# Patient Record
Sex: MALE | Race: WHITE | NOT HISPANIC OR LATINO | URBAN - METROPOLITAN AREA
[De-identification: names, ages, dates, MRNs, and addresses within clinical notes are randomized per-mention and may not be internally consistent; named-entity substitution may affect disease eponyms.]

---

## 2017-02-02 ENCOUNTER — COMMUNICATION - HEALTHEAST (OUTPATIENT)
Dept: INTERNAL MEDICINE | Facility: CLINIC | Age: 82
End: 2017-02-02

## 2017-02-02 DIAGNOSIS — E78.5 HYPERLIPIDEMIA: ICD-10-CM

## 2017-05-10 ENCOUNTER — COMMUNICATION - HEALTHEAST (OUTPATIENT)
Dept: INTERNAL MEDICINE | Facility: CLINIC | Age: 82
End: 2017-05-10

## 2017-05-10 DIAGNOSIS — E03.9 HYPOTHYROIDISM: ICD-10-CM

## 2017-05-17 ENCOUNTER — OFFICE VISIT - HEALTHEAST (OUTPATIENT)
Dept: INTERNAL MEDICINE | Facility: CLINIC | Age: 82
End: 2017-05-17

## 2017-05-17 DIAGNOSIS — E03.9 HYPOTHYROIDISM: ICD-10-CM

## 2017-05-17 DIAGNOSIS — E78.00 PURE HYPERCHOLESTEROLEMIA: ICD-10-CM

## 2017-05-17 DIAGNOSIS — N40.0 BPH (BENIGN PROSTATIC HYPERPLASIA): ICD-10-CM

## 2017-05-17 DIAGNOSIS — I10 ESSENTIAL HYPERTENSION: ICD-10-CM

## 2017-05-17 ASSESSMENT — MIFFLIN-ST. JEOR: SCORE: 1689.19

## 2017-08-08 ENCOUNTER — COMMUNICATION - HEALTHEAST (OUTPATIENT)
Dept: INTERNAL MEDICINE | Facility: CLINIC | Age: 82
End: 2017-08-08

## 2017-08-08 DIAGNOSIS — I10 UNSPECIFIED ESSENTIAL HYPERTENSION: ICD-10-CM

## 2017-08-15 ENCOUNTER — COMMUNICATION - HEALTHEAST (OUTPATIENT)
Dept: INTERNAL MEDICINE | Facility: CLINIC | Age: 82
End: 2017-08-15

## 2017-08-15 ENCOUNTER — OFFICE VISIT - HEALTHEAST (OUTPATIENT)
Dept: INTERNAL MEDICINE | Facility: CLINIC | Age: 82
End: 2017-08-15

## 2017-08-15 DIAGNOSIS — E03.9 HYPOTHYROIDISM: ICD-10-CM

## 2017-08-15 DIAGNOSIS — I10 ESSENTIAL HYPERTENSION: ICD-10-CM

## 2017-08-16 ENCOUNTER — AMBULATORY - HEALTHEAST (OUTPATIENT)
Dept: INTERNAL MEDICINE | Facility: CLINIC | Age: 82
End: 2017-08-16

## 2017-09-12 ENCOUNTER — COMMUNICATION - HEALTHEAST (OUTPATIENT)
Dept: INTERNAL MEDICINE | Facility: CLINIC | Age: 82
End: 2017-09-12

## 2017-09-12 DIAGNOSIS — N40.0 BPH (BENIGN PROSTATIC HYPERPLASIA): ICD-10-CM

## 2017-10-31 ENCOUNTER — COMMUNICATION - HEALTHEAST (OUTPATIENT)
Dept: INTERNAL MEDICINE | Facility: CLINIC | Age: 82
End: 2017-10-31

## 2017-10-31 DIAGNOSIS — E78.5 HYPERLIPIDEMIA: ICD-10-CM

## 2017-11-20 ENCOUNTER — OFFICE VISIT - HEALTHEAST (OUTPATIENT)
Dept: INTERNAL MEDICINE | Facility: CLINIC | Age: 82
End: 2017-11-20

## 2017-11-20 DIAGNOSIS — E03.9 HYPOTHYROIDISM: ICD-10-CM

## 2017-11-20 DIAGNOSIS — I10 ESSENTIAL HYPERTENSION: ICD-10-CM

## 2018-01-01 ENCOUNTER — RECORDS - HEALTHEAST (OUTPATIENT)
Dept: GENERAL RADIOLOGY | Facility: CLINIC | Age: 83
End: 2018-01-01

## 2018-01-01 ENCOUNTER — OFFICE VISIT - HEALTHEAST (OUTPATIENT)
Dept: INTERNAL MEDICINE | Facility: CLINIC | Age: 83
End: 2018-01-01

## 2018-01-01 DIAGNOSIS — M25.551 HIP PAIN, RIGHT: ICD-10-CM

## 2018-01-01 DIAGNOSIS — R39.11 BENIGN PROSTATIC HYPERPLASIA WITH URINARY HESITANCY: ICD-10-CM

## 2018-01-01 DIAGNOSIS — E03.9 ACQUIRED HYPOTHYROIDISM: ICD-10-CM

## 2018-01-01 DIAGNOSIS — I10 ESSENTIAL HYPERTENSION: ICD-10-CM

## 2018-01-01 DIAGNOSIS — H35.30 MACULAR DEGENERATION (SENILE) OF RETINA: ICD-10-CM

## 2018-01-01 DIAGNOSIS — G70.00 MYASTHENIA GRAVIS (H): ICD-10-CM

## 2018-01-01 DIAGNOSIS — M25.551 PAIN IN RIGHT HIP: ICD-10-CM

## 2018-01-01 DIAGNOSIS — N40.1 BENIGN PROSTATIC HYPERPLASIA WITH URINARY HESITANCY: ICD-10-CM

## 2018-01-01 RX ORDER — POLYETHYLENE GLYCOL 3350 17 G/17G
17 POWDER, FOR SOLUTION ORAL DAILY PRN
Status: SHIPPED | COMMUNITY
Start: 2018-01-01

## 2018-01-01 RX ORDER — PYRIDOSTIGMINE BROMIDE 60 MG/1
60 TABLET ORAL 4 TIMES DAILY
Refills: 3 | Status: SHIPPED | COMMUNITY
Start: 2018-07-26

## 2018-02-12 ENCOUNTER — COMMUNICATION - HEALTHEAST (OUTPATIENT)
Dept: INTERNAL MEDICINE | Facility: CLINIC | Age: 83
End: 2018-02-12

## 2018-02-12 DIAGNOSIS — E03.9 HYPOTHYROID: ICD-10-CM

## 2018-03-03 ENCOUNTER — COMMUNICATION - HEALTHEAST (OUTPATIENT)
Dept: INTERNAL MEDICINE | Facility: CLINIC | Age: 83
End: 2018-03-03

## 2018-03-03 DIAGNOSIS — N40.0 BPH (BENIGN PROSTATIC HYPERPLASIA): ICD-10-CM

## 2018-03-27 ENCOUNTER — OFFICE VISIT - HEALTHEAST (OUTPATIENT)
Dept: FAMILY MEDICINE | Facility: CLINIC | Age: 83
End: 2018-03-27

## 2018-03-27 DIAGNOSIS — J20.9 ACUTE BRONCHITIS WITH SYMPTOMS > 10 DAYS: ICD-10-CM

## 2018-04-16 ENCOUNTER — OFFICE VISIT - HEALTHEAST (OUTPATIENT)
Dept: INTERNAL MEDICINE | Facility: CLINIC | Age: 83
End: 2018-04-16

## 2018-04-16 DIAGNOSIS — I10 ESSENTIAL HYPERTENSION: ICD-10-CM

## 2018-04-16 DIAGNOSIS — E78.00 PURE HYPERCHOLESTEROLEMIA: ICD-10-CM

## 2018-04-16 DIAGNOSIS — E03.9 HYPOTHYROIDISM: ICD-10-CM

## 2018-04-16 LAB
ALBUMIN SERPL-MCNC: 3.4 G/DL (ref 3.5–5)
ALBUMIN UR-MCNC: NEGATIVE MG/DL
ALP SERPL-CCNC: 118 U/L (ref 45–120)
ALT SERPL W P-5'-P-CCNC: 20 U/L (ref 0–45)
ANION GAP SERPL CALCULATED.3IONS-SCNC: 9 MMOL/L (ref 5–18)
APPEARANCE UR: CLEAR
AST SERPL W P-5'-P-CCNC: 17 U/L (ref 0–40)
BILIRUB SERPL-MCNC: 0.7 MG/DL (ref 0–1)
BILIRUB UR QL STRIP: NEGATIVE
BUN SERPL-MCNC: 13 MG/DL (ref 8–28)
CALCIUM SERPL-MCNC: 8.9 MG/DL (ref 8.5–10.5)
CHLORIDE BLD-SCNC: 101 MMOL/L (ref 98–107)
CHOLEST SERPL-MCNC: 136 MG/DL
CO2 SERPL-SCNC: 25 MMOL/L (ref 22–31)
COLOR UR AUTO: YELLOW
CREAT SERPL-MCNC: 0.77 MG/DL (ref 0.7–1.3)
ERYTHROCYTE [DISTWIDTH] IN BLOOD BY AUTOMATED COUNT: 12.2 % (ref 11–14.5)
FASTING STATUS PATIENT QL REPORTED: YES
GFR SERPL CREATININE-BSD FRML MDRD: >60 ML/MIN/1.73M2
GLUCOSE BLD-MCNC: 92 MG/DL (ref 70–125)
GLUCOSE UR STRIP-MCNC: NEGATIVE MG/DL
HCT VFR BLD AUTO: 42.2 % (ref 40–54)
HDLC SERPL-MCNC: 46 MG/DL
HGB BLD-MCNC: 14.2 G/DL (ref 14–18)
HGB UR QL STRIP: NEGATIVE
KETONES UR STRIP-MCNC: NEGATIVE MG/DL
LDLC SERPL CALC-MCNC: 76 MG/DL
LEUKOCYTE ESTERASE UR QL STRIP: NEGATIVE
MCH RBC QN AUTO: 31.1 PG (ref 27–34)
MCHC RBC AUTO-ENTMCNC: 33.6 G/DL (ref 32–36)
MCV RBC AUTO: 93 FL (ref 80–100)
NITRATE UR QL: NEGATIVE
PH UR STRIP: 7 [PH] (ref 5–8)
PLATELET # BLD AUTO: 232 THOU/UL (ref 140–440)
PMV BLD AUTO: 7.7 FL (ref 7–10)
POTASSIUM BLD-SCNC: 4.8 MMOL/L (ref 3.5–5)
PROT SERPL-MCNC: 6.7 G/DL (ref 6–8)
RBC # BLD AUTO: 4.56 MILL/UL (ref 4.4–6.2)
SODIUM SERPL-SCNC: 135 MMOL/L (ref 136–145)
SP GR UR STRIP: 1.01 (ref 1–1.03)
TRIGL SERPL-MCNC: 68 MG/DL
TSH SERPL DL<=0.005 MIU/L-ACNC: 3.36 UIU/ML (ref 0.3–5)
UROBILINOGEN UR STRIP-ACNC: NORMAL
WBC: 8.3 THOU/UL (ref 4–11)

## 2018-04-20 ENCOUNTER — AMBULATORY - HEALTHEAST (OUTPATIENT)
Dept: LAB | Facility: CLINIC | Age: 83
End: 2018-04-20

## 2018-04-20 ENCOUNTER — RECORDS - HEALTHEAST (OUTPATIENT)
Dept: ADMINISTRATIVE | Facility: OTHER | Age: 83
End: 2018-04-20

## 2018-04-20 DIAGNOSIS — H53.2 VERTICAL DIPLOPIA: ICD-10-CM

## 2018-04-20 LAB
C REACTIVE PROTEIN LHE: 0.6 MG/DL (ref 0–0.8)
ERYTHROCYTE [SEDIMENTATION RATE] IN BLOOD BY WESTERGREN METHOD: 22 MM/HR (ref 0–15)

## 2018-04-27 LAB — ACHR BIND IGG+IGM SER IA-SCNC: 5.23 NMOL/L

## 2018-04-30 ENCOUNTER — COMMUNICATION - HEALTHEAST (OUTPATIENT)
Dept: INTERNAL MEDICINE | Facility: CLINIC | Age: 83
End: 2018-04-30

## 2018-04-30 DIAGNOSIS — E78.5 HYPERLIPIDEMIA: ICD-10-CM

## 2018-05-01 ENCOUNTER — COMMUNICATION - HEALTHEAST (OUTPATIENT)
Dept: INTERNAL MEDICINE | Facility: CLINIC | Age: 83
End: 2018-05-01

## 2018-05-01 DIAGNOSIS — G70.00 MYASTHENIA GRAVIS (H): ICD-10-CM

## 2018-05-01 DIAGNOSIS — H53.9 VISION ABNORMALITIES: ICD-10-CM

## 2018-05-05 ENCOUNTER — COMMUNICATION - HEALTHEAST (OUTPATIENT)
Dept: INTERNAL MEDICINE | Facility: CLINIC | Age: 83
End: 2018-05-05

## 2018-05-05 DIAGNOSIS — I10 ESSENTIAL HYPERTENSION: ICD-10-CM

## 2018-05-13 ENCOUNTER — COMMUNICATION - HEALTHEAST (OUTPATIENT)
Dept: INTERNAL MEDICINE | Facility: CLINIC | Age: 83
End: 2018-05-13

## 2018-05-13 DIAGNOSIS — E03.9 HYPOTHYROID: ICD-10-CM

## 2018-06-03 ENCOUNTER — COMMUNICATION - HEALTHEAST (OUTPATIENT)
Dept: INTERNAL MEDICINE | Facility: CLINIC | Age: 83
End: 2018-06-03

## 2018-06-03 DIAGNOSIS — N40.0 BPH (BENIGN PROSTATIC HYPERPLASIA): ICD-10-CM

## 2018-07-26 ENCOUNTER — RECORDS - HEALTHEAST (OUTPATIENT)
Dept: LAB | Facility: HOSPITAL | Age: 83
End: 2018-07-26

## 2018-07-26 ENCOUNTER — RECORDS - HEALTHEAST (OUTPATIENT)
Dept: ADMINISTRATIVE | Facility: OTHER | Age: 83
End: 2018-07-26

## 2018-07-31 ENCOUNTER — HOSPITAL ENCOUNTER (OUTPATIENT)
Dept: CT IMAGING | Facility: CLINIC | Age: 83
Discharge: HOME | End: 2018-07-31

## 2018-07-31 DIAGNOSIS — G70.00 OCULAR MYASTHENIA GRAVIS (H): ICD-10-CM

## 2018-07-31 LAB
CREAT BLD-MCNC: 0.9 MG/DL
POC GFR AMER AF HE - HISTORICAL: >60 ML/MIN/1.73M2
POC GFR NON AMER AF HE - HISTORICAL: >60 ML/MIN/1.73M2

## 2018-08-07 LAB
MISCELLANEOUS TEST DEPT. - HE HISTORICAL: NORMAL
PERFORMING LAB: NORMAL
SPECIMEN STATUS: NORMAL
TEST NAME: NORMAL

## 2018-09-07 ENCOUNTER — RECORDS - HEALTHEAST (OUTPATIENT)
Dept: ADMINISTRATIVE | Facility: OTHER | Age: 83
End: 2018-09-07

## 2019-01-01 ENCOUNTER — RECORDS - HEALTHEAST (OUTPATIENT)
Dept: LAB | Facility: HOSPITAL | Age: 84
End: 2019-01-01

## 2019-01-01 ENCOUNTER — COMMUNICATION - HEALTHEAST (OUTPATIENT)
Dept: INTERNAL MEDICINE | Facility: CLINIC | Age: 84
End: 2019-01-01

## 2019-01-01 ENCOUNTER — AMBULATORY - HEALTHEAST (OUTPATIENT)
Dept: OTHER | Facility: CLINIC | Age: 84
End: 2019-01-01

## 2019-01-01 ENCOUNTER — OFFICE VISIT - HEALTHEAST (OUTPATIENT)
Dept: INTERNAL MEDICINE | Facility: CLINIC | Age: 84
End: 2019-01-01

## 2019-01-01 ENCOUNTER — INFUSION - HEALTHEAST (OUTPATIENT)
Dept: INFUSION THERAPY | Facility: HOSPITAL | Age: 84
End: 2019-01-01

## 2019-01-01 ENCOUNTER — SURGERY - HEALTHEAST (OUTPATIENT)
Dept: GASTROENTEROLOGY | Facility: CLINIC | Age: 84
End: 2019-01-01

## 2019-01-01 ENCOUNTER — ANESTHESIA - HEALTHEAST (OUTPATIENT)
Dept: INTENSIVE CARE | Facility: CLINIC | Age: 84
End: 2019-01-01

## 2019-01-01 ENCOUNTER — RECORDS - HEALTHEAST (OUTPATIENT)
Dept: ADMINISTRATIVE | Facility: OTHER | Age: 84
End: 2019-01-01

## 2019-01-01 ENCOUNTER — RECORDS - HEALTHEAST (OUTPATIENT)
Dept: INTENSIVE CARE | Facility: CLINIC | Age: 84
End: 2019-01-01

## 2019-01-01 ENCOUNTER — HOME CARE/HOSPICE - HEALTHEAST (OUTPATIENT)
Dept: HOME HEALTH SERVICES | Facility: HOME HEALTH | Age: 84
End: 2019-01-01

## 2019-01-01 ENCOUNTER — COMMUNICATION - HEALTHEAST (OUTPATIENT)
Dept: SCHEDULING | Facility: CLINIC | Age: 84
End: 2019-01-01

## 2019-01-01 ENCOUNTER — ANESTHESIA - HEALTHEAST (OUTPATIENT)
Dept: SURGERY | Facility: CLINIC | Age: 84
End: 2019-01-01

## 2019-01-01 ENCOUNTER — SURGERY - HEALTHEAST (OUTPATIENT)
Dept: SURGERY | Facility: CLINIC | Age: 84
End: 2019-01-01

## 2019-01-01 DIAGNOSIS — G70.00 MYASTHENIA GRAVIS (H): ICD-10-CM

## 2019-01-01 DIAGNOSIS — I10 ESSENTIAL HYPERTENSION: ICD-10-CM

## 2019-01-01 DIAGNOSIS — E78.5 HYPERLIPIDEMIA: ICD-10-CM

## 2019-01-01 DIAGNOSIS — E03.9 HYPOTHYROID: ICD-10-CM

## 2019-01-01 DIAGNOSIS — N40.0 BPH (BENIGN PROSTATIC HYPERPLASIA): ICD-10-CM

## 2019-01-01 DIAGNOSIS — Z01.818 PREOP GENERAL PHYSICAL EXAM: ICD-10-CM

## 2019-01-01 LAB
ALBUMIN SERPL-MCNC: 3.5 G/DL (ref 3.5–5)
ALP SERPL-CCNC: 98 U/L (ref 45–120)
ALT SERPL W P-5'-P-CCNC: 30 U/L (ref 0–45)
ANION GAP SERPL CALCULATED.3IONS-SCNC: 6 MMOL/L (ref 5–18)
ANION GAP SERPL CALCULATED.3IONS-SCNC: 9 MMOL/L (ref 5–18)
ANION GAP SERPL CALCULATED.3IONS-SCNC: 9 MMOL/L (ref 5–18)
AST SERPL W P-5'-P-CCNC: 20 U/L (ref 0–40)
ATRIAL RATE - MUSE: 72 BPM
BASOPHILS # BLD AUTO: 0.1 THOU/UL (ref 0–0.2)
BASOPHILS # BLD AUTO: 0.1 THOU/UL (ref 0–0.2)
BASOPHILS NFR BLD AUTO: 0 % (ref 0–2)
BASOPHILS NFR BLD AUTO: 1 % (ref 0–2)
BILIRUB SERPL-MCNC: 0.7 MG/DL (ref 0–1)
BUN SERPL-MCNC: 16 MG/DL (ref 8–28)
BUN SERPL-MCNC: 20 MG/DL (ref 8–28)
BUN SERPL-MCNC: 22 MG/DL (ref 8–28)
CALCIUM SERPL-MCNC: 9 MG/DL (ref 8.5–10.5)
CALCIUM SERPL-MCNC: 9.2 MG/DL (ref 8.5–10.5)
CALCIUM SERPL-MCNC: 9.3 MG/DL (ref 8.5–10.5)
CHLORIDE BLD-SCNC: 101 MMOL/L (ref 98–107)
CHLORIDE BLD-SCNC: 93 MMOL/L (ref 98–107)
CHLORIDE BLD-SCNC: 98 MMOL/L (ref 98–107)
CO2 SERPL-SCNC: 24 MMOL/L (ref 22–31)
CO2 SERPL-SCNC: 27 MMOL/L (ref 22–31)
CO2 SERPL-SCNC: 29 MMOL/L (ref 22–31)
CREAT SERPL-MCNC: 0.87 MG/DL (ref 0.7–1.3)
CREAT SERPL-MCNC: 0.89 MG/DL (ref 0.7–1.3)
CREAT SERPL-MCNC: 0.97 MG/DL (ref 0.7–1.3)
DIASTOLIC BLOOD PRESSURE - MUSE: NORMAL MMHG
EOSINOPHIL # BLD AUTO: 0.1 THOU/UL (ref 0–0.4)
EOSINOPHIL # BLD AUTO: 0.6 THOU/UL (ref 0–0.4)
EOSINOPHIL NFR BLD AUTO: 0 % (ref 0–6)
EOSINOPHIL NFR BLD AUTO: 7 % (ref 0–6)
ERYTHROCYTE [DISTWIDTH] IN BLOOD BY AUTOMATED COUNT: 11.6 % (ref 11–14.5)
ERYTHROCYTE [DISTWIDTH] IN BLOOD BY AUTOMATED COUNT: 13.4 % (ref 11–14.5)
GFR SERPL CREATININE-BSD FRML MDRD: >60 ML/MIN/1.73M2
GLUCOSE BLD-MCNC: 85 MG/DL (ref 70–125)
GLUCOSE BLD-MCNC: 94 MG/DL (ref 70–125)
GLUCOSE BLD-MCNC: 96 MG/DL (ref 70–125)
HCT VFR BLD AUTO: 45.9 % (ref 40–54)
HCT VFR BLD AUTO: 46.5 % (ref 40–54)
HGB BLD-MCNC: 15.3 G/DL (ref 14–18)
HGB BLD-MCNC: 15.8 G/DL (ref 14–18)
IGA SERPL-MCNC: 191 MG/DL (ref 65–400)
INTERPRETATION ECG - MUSE: NORMAL
LYMPHOCYTES # BLD AUTO: 1.3 THOU/UL (ref 0.8–4.4)
LYMPHOCYTES # BLD AUTO: 2 THOU/UL (ref 0.8–4.4)
LYMPHOCYTES NFR BLD AUTO: 21 % (ref 20–40)
LYMPHOCYTES NFR BLD AUTO: 8 % (ref 20–40)
MCH RBC QN AUTO: 31.3 PG (ref 27–34)
MCH RBC QN AUTO: 31.3 PG (ref 27–34)
MCHC RBC AUTO-ENTMCNC: 33.4 G/DL (ref 32–36)
MCHC RBC AUTO-ENTMCNC: 34 G/DL (ref 32–36)
MCV RBC AUTO: 92 FL (ref 80–100)
MCV RBC AUTO: 94 FL (ref 80–100)
MONOCYTES # BLD AUTO: 0.9 THOU/UL (ref 0–0.9)
MONOCYTES # BLD AUTO: 1.3 THOU/UL (ref 0–0.9)
MONOCYTES NFR BLD AUTO: 8 % (ref 2–10)
MONOCYTES NFR BLD AUTO: 9 % (ref 2–10)
NEUTROPHILS # BLD AUTO: 13.5 THOU/UL (ref 2–7.7)
NEUTROPHILS # BLD AUTO: 6 THOU/UL (ref 2–7.7)
NEUTROPHILS NFR BLD AUTO: 62 % (ref 50–70)
NEUTROPHILS NFR BLD AUTO: 84 % (ref 50–70)
P AXIS - MUSE: 39 DEGREES
PLATELET # BLD AUTO: 204 THOU/UL (ref 140–440)
PLATELET # BLD AUTO: 298 THOU/UL (ref 140–440)
PMV BLD AUTO: 7.3 FL (ref 7–10)
PMV BLD AUTO: 9.2 FL (ref 8.5–12.5)
POTASSIUM BLD-SCNC: 4 MMOL/L (ref 3.5–5)
POTASSIUM BLD-SCNC: 4.6 MMOL/L (ref 3.5–5)
POTASSIUM BLD-SCNC: 4.7 MMOL/L (ref 3.5–5)
PR INTERVAL - MUSE: 222 MS
PROT SERPL-MCNC: 6.7 G/DL (ref 6–8)
QRS DURATION - MUSE: 90 MS
QT - MUSE: 386 MS
QTC - MUSE: 422 MS
R AXIS - MUSE: -16 DEGREES
RBC # BLD AUTO: 4.9 MILL/UL (ref 4.4–6.2)
RBC # BLD AUTO: 5.05 MILL/UL (ref 4.4–6.2)
SODIUM SERPL-SCNC: 129 MMOL/L (ref 136–145)
SODIUM SERPL-SCNC: 131 MMOL/L (ref 136–145)
SODIUM SERPL-SCNC: 133 MMOL/L (ref 136–145)
SODIUM SERPL-SCNC: 136 MMOL/L (ref 136–145)
SYSTOLIC BLOOD PRESSURE - MUSE: NORMAL MMHG
T AXIS - MUSE: 29 DEGREES
VENTRICULAR RATE- MUSE: 72 BPM
WBC: 16.4 THOU/UL (ref 4–11)
WBC: 9.6 THOU/UL (ref 4–11)

## 2019-01-01 RX ORDER — PREDNISONE 10 MG/1
35 TABLET ORAL DAILY
Status: SHIPPED | COMMUNITY
Start: 2019-01-01

## 2019-01-01 RX ORDER — RAMIPRIL 10 MG/1
10 CAPSULE ORAL 2 TIMES DAILY
Qty: 180 CAPSULE | Refills: 2 | Status: SHIPPED
Start: 2019-01-01

## 2019-01-01 RX ORDER — TAMSULOSIN HYDROCHLORIDE 0.4 MG/1
0.4 CAPSULE ORAL DAILY
Qty: 90 CAPSULE | Refills: 3 | Status: SHIPPED | OUTPATIENT
Start: 2019-01-01

## 2019-01-01 RX ORDER — LEVOTHYROXINE SODIUM 75 UG/1
TABLET ORAL
Qty: 90 TABLET | Refills: 3 | Status: SHIPPED | OUTPATIENT
Start: 2019-01-01

## 2019-01-01 RX ORDER — AMLODIPINE BESYLATE 5 MG/1
5 TABLET ORAL DAILY
Qty: 30 TABLET | Refills: 3 | Status: SHIPPED | OUTPATIENT
Start: 2019-01-01

## 2019-01-01 RX ORDER — SIMVASTATIN 40 MG
40 TABLET ORAL DAILY
Qty: 90 TABLET | Refills: 2 | Status: SHIPPED | OUTPATIENT
Start: 2019-01-01

## 2019-01-01 ASSESSMENT — MIFFLIN-ST. JEOR
SCORE: 1634.76
SCORE: 1711.63
SCORE: 1711.63
SCORE: 1644.29
SCORE: 1768.57
SCORE: 1634.31
SCORE: 1631.59
SCORE: 1644.29
SCORE: 1642.02
SCORE: 1681.93
SCORE: 1768.57
SCORE: 1633.85
SCORE: 1748.63
SCORE: 1704.16
SCORE: 1634.31
SCORE: 1631.59
SCORE: 1652.45
SCORE: 1711.63
SCORE: 1748.63
SCORE: 1674.22
SCORE: 1644.29
SCORE: 1634.31
SCORE: 1652.45
SCORE: 1674.22
SCORE: 1710.06
SCORE: 1644.29
SCORE: 1652.45
SCORE: 1710.06
SCORE: 1642.02
SCORE: 1711.63
SCORE: 1623.75
SCORE: 1631.59
SCORE: 1674.22
SCORE: 1642.02
SCORE: 1748.63
SCORE: 1748.63
SCORE: 1634.76
SCORE: 1681.93
SCORE: 1692.24
SCORE: 1634.76
SCORE: 1634.76
SCORE: 1681.93
SCORE: 1710.06
SCORE: 1634.31
SCORE: 1642.02
SCORE: 1768.57
SCORE: 1631.59
SCORE: 1633.85
SCORE: 1704.16
SCORE: 1652.45
SCORE: 1633.85
SCORE: 1633.85
SCORE: 1710.06
SCORE: 1704.16
SCORE: 1768.57
SCORE: 1704.16
SCORE: 1674.22
SCORE: 1681.93
SCORE: 1632.82

## 2021-05-28 NOTE — PROGRESS NOTES
Pt here today for IGG. Pt was given premeds.  IV started with good blood return and IGG given without complications. IV flushed with NS and then DC'd. Site covered with gauze and coban.  Pt refused DC instructions and knows when to return. Pt left stable with his family.

## 2021-05-28 NOTE — PROGRESS NOTES
"0850 saeed \"Prosper\" arrived A&Ox4 with his wife. Seated in chair #4. 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 He has had the dx for a long time but reports worsening sx in the last few months. He had hip replacement in January and wife states he was \"doing great for a while\".  Pt reports he gets easily fatigued, takes several naps daily, sleeps a full night, diplopia in the evening, unsteady gait but stable with his cane, right side of body more weak than the left. He denies any recent illness/injury/surgery. POC/medication education reviewed, pt and wife stated understanding and agreed to plan.  PIV w ease L FA, excellent blood return, line easily flushed NS  Pre meds given as ordered.  IV Ig infused as ordered, titrated rate. Prosper tolerated IgG infusion w/o sxs adverse Rxn, line flushed NS40ml and pt monitored 30min post infusion. VSS, with one spike around lunchtime, pt report nothing different, and BP returned to baseline after lunch.  PIV dc'd, site covered w clean dressing/coban. Dc education/AVS reviewed, pt and wife stated understanding and that their needs were met today  1340 Prosper balderrama'd A&Ox4 with his wife.  Pt was originally scheduled for today then 5/21, 5/22 and 5/23, Infusion had an opening for tomorrow so Saeed will take that appointment.  "

## 2021-05-28 NOTE — PATIENT INSTRUCTIONS - HE
Patient Education     Immune Globulin Solution for injection  What is this medicine?  IMMUNE GLOBULIN (im MUNE GLOB benoit mak) helps to prevent or reduce the severity of certain infections in patients who are at risk. This medicine is collected from the pooled blood of many donors. It is used to treat immune system problems, thrombocytopenia, and Kawasaki syndrome.  This medicine may be used for other purposes; ask your health care provider or pharmacist if you have questions.  What should I tell my health care provider before I take this medicine?  They need to know if you have any of these conditions:    diabetes    extremely low or no immune antibodies in the blood    heart disease    history of blood clots    hyperprolinemia    infection in the blood, sepsis    kidney disease    taking medicine that may change kidney function - ask your health care provider about your medicine    an unusual or allergic reaction to human immune globulin, albumin, maltose, sucrose, polysorbate 80, other medicines, foods, dyes, or preservatives    pregnant or trying to get pregnant    breast-feeding  How should I use this medicine?  This medicine is for injection into a muscle or infusion into a vein or skin. It is usually given by a health care professional in a hospital or clinic setting.  In rare cases, some brands of this medicine might be given at home. You will be taught how to give this medicine. Use exactly as directed. Take your medicine at regular intervals. Do not take your medicine more often than directed.  Talk to your pediatrician regarding the use of this medicine in children. Special care may be needed.  Overdosage: If you think you have taken too much of this medicine contact a poison control center or emergency room at once.  NOTE: This medicine is only for you. Do not share this medicine with others.  What if I miss a dose?  It is important not to miss your dose. Call your doctor or health care professional if  you are unable to keep an appointment. If you give yourself the medicine and you miss a dose, take it as soon as you can. If it is almost time for your next dose, take only that dose. Do not take double or extra doses.  What may interact with this medicine?    aspirin and aspirin-like medicines    cisplatin    cyclosporine    medicines for infection like acyclovir, adefovir, amphotericin B, bacitracin, cidofovir, foscarnet, ganciclovir, gentamicin, pentamidine, vancomycin    NSAIDS, medicines for pain and inflammation, like ibuprofen or naproxen    pamidronate    vaccines    zoledronic acid  This list may not describe all possible interactions. Give your health care provider a list of all the medicines, herbs, non-prescription drugs, or dietary supplements you use. Also tell them if you smoke, drink alcohol, or use illegal drugs. Some items may interact with your medicine.  What should I watch for while using this medicine?  Your condition will be monitored carefully while you are receiving this medicine.  This medicine is made from pooled blood donations of many different people. It may be possible to pass an infection in this medicine. However, the donors are screened for infections and all products are tested for HIV and hepatitis. The medicine is treated to kill most or all bacteria and viruses. Talk to your doctor about the risks and benefits of this medicine.  Do not have vaccinations for at least 14 days before, or until at least 3 months after receiving this medicine.  What side effects may I notice from receiving this medicine?  Side effects that you should report to your doctor or health care professional as soon as possible:    allergic reactions like skin rash, itching or hives, swelling of the face, lips, or tongue    breathing problems    chest pain or tightness    fever, chills    headache with nausea, vomiting    neck pain or difficulty moving neck    pain when moving eyes    pain, swelling, warmth  in the leg    problems with balance, talking, walking    sudden weight gain    swelling of the ankles, feet, hands    trouble passing urine or change in the amount of urine  Side effects that usually do not require medical attention (report to your doctor or health care professional if they continue or are bothersome):    dizzy, drowsy    flushing    increased sweating    leg cramps    muscle aches and pains    pain at site where injected  This list may not describe all possible side effects. Call your doctor for medical advice about side effects. You may report side effects to FDA at 0-031-FDA-5794.  Where should I keep my medicine?  Keep out of the reach of children.  This drug is usually given in a hospital or clinic and will not be stored at home.  In rare cases, some brands of this medicine may be given at home. If you are using this medicine at home, you will be instructed on how to store this medicine. Throw away any unused medicine after the expiration date on the label.  NOTE: This sheet is a summary. It may not cover all possible information. If you have questions about this medicine, talk to your doctor, pharmacist, or health care provider.  NOTE:This sheet is a summary. It may not cover all possible information. If you have questions about this medicine, talk to your doctor, pharmacist, or health care provider. Copyright  2015 [employer] Standard

## 2021-05-28 NOTE — PATIENT INSTRUCTIONS - HE
Patient Education     Immune Globulin Solution for injection  What is this medicine?  IMMUNE GLOBULIN (im MUNE GLOB benoit mak) helps to prevent or reduce the severity of certain infections in patients who are at risk. This medicine is collected from the pooled blood of many donors. It is used to treat immune system problems, thrombocytopenia, and Kawasaki syndrome.  This medicine may be used for other purposes; ask your health care provider or pharmacist if you have questions.  What should I tell my health care provider before I take this medicine?  They need to know if you have any of these conditions:    diabetes    extremely low or no immune antibodies in the blood    heart disease    history of blood clots    hyperprolinemia    infection in the blood, sepsis    kidney disease    taking medicine that may change kidney function - ask your health care provider about your medicine    an unusual or allergic reaction to human immune globulin, albumin, maltose, sucrose, polysorbate 80, other medicines, foods, dyes, or preservatives    pregnant or trying to get pregnant    breast-feeding  How should I use this medicine?  This medicine is for injection into a muscle or infusion into a vein or skin. It is usually given by a health care professional in a hospital or clinic setting.  In rare cases, some brands of this medicine might be given at home. You will be taught how to give this medicine. Use exactly as directed. Take your medicine at regular intervals. Do not take your medicine more often than directed.  Talk to your pediatrician regarding the use of this medicine in children. Special care may be needed.  Overdosage: If you think you have taken too much of this medicine contact a poison control center or emergency room at once.  NOTE: This medicine is only for you. Do not share this medicine with others.  What if I miss a dose?  It is important not to miss your dose. Call your doctor or health care professional if  you are unable to keep an appointment. If you give yourself the medicine and you miss a dose, take it as soon as you can. If it is almost time for your next dose, take only that dose. Do not take double or extra doses.  What may interact with this medicine?    aspirin and aspirin-like medicines    cisplatin    cyclosporine    medicines for infection like acyclovir, adefovir, amphotericin B, bacitracin, cidofovir, foscarnet, ganciclovir, gentamicin, pentamidine, vancomycin    NSAIDS, medicines for pain and inflammation, like ibuprofen or naproxen    pamidronate    vaccines    zoledronic acid  This list may not describe all possible interactions. Give your health care provider a list of all the medicines, herbs, non-prescription drugs, or dietary supplements you use. Also tell them if you smoke, drink alcohol, or use illegal drugs. Some items may interact with your medicine.  What should I watch for while using this medicine?  Your condition will be monitored carefully while you are receiving this medicine.  This medicine is made from pooled blood donations of many different people. It may be possible to pass an infection in this medicine. However, the donors are screened for infections and all products are tested for HIV and hepatitis. The medicine is treated to kill most or all bacteria and viruses. Talk to your doctor about the risks and benefits of this medicine.  Do not have vaccinations for at least 14 days before, or until at least 3 months after receiving this medicine.  What side effects may I notice from receiving this medicine?  Side effects that you should report to your doctor or health care professional as soon as possible:    allergic reactions like skin rash, itching or hives, swelling of the face, lips, or tongue    breathing problems    chest pain or tightness    fever, chills    headache with nausea, vomiting    neck pain or difficulty moving neck    pain when moving eyes    pain, swelling, warmth  in the leg    problems with balance, talking, walking    sudden weight gain    swelling of the ankles, feet, hands    trouble passing urine or change in the amount of urine  Side effects that usually do not require medical attention (report to your doctor or health care professional if they continue or are bothersome):    dizzy, drowsy    flushing    increased sweating    leg cramps    muscle aches and pains    pain at site where injected  This list may not describe all possible side effects. Call your doctor for medical advice about side effects. You may report side effects to FDA at 9-516-FDA-2081.  Where should I keep my medicine?  Keep out of the reach of children.  This drug is usually given in a hospital or clinic and will not be stored at home.  In rare cases, some brands of this medicine may be given at home. If you are using this medicine at home, you will be instructed on how to store this medicine. Throw away any unused medicine after the expiration date on the label.  NOTE: This sheet is a summary. It may not cover all possible information. If you have questions about this medicine, talk to your doctor, pharmacist, or health care provider.  NOTE:This sheet is a summary. It may not cover all possible information. If you have questions about this medicine, talk to your doctor, pharmacist, or health care provider. Copyright  2015 Gold Standard

## 2021-05-28 NOTE — TELEPHONE ENCOUNTER
Refill Approved    Rx renewed per Medication Renewal Policy. Medication was last renewed on 4/30/18, last OV 1/10/19.    Jaimie Sanches, [facility] Triage/Med Refill 4/27/2019     Requested Prescriptions   Pending Prescriptions Disp Refills     simvastatin (ZOCOR) 40 MG tablet [Pharmacy Med Name: SIMVASTATIN 40MG TABLETS] 90 tablet 0     Sig: TAKE 1 TABLET BY MOUTH DAILY       Statins Refill Protocol (Hmg CoA Reductase Inhibitors) Passed - 4/26/2019  3:51 AM        Passed - PCP or prescribing provider visit in past 12 months      Last office visit with prescriber/PCP: 4/16/2018 Graeme Enrique MD OR same dept: Visit date not found OR same specialty: 10/29/2018 Addi Toure MD  Last physical: 5/17/2017 Last MTM visit: Visit date not found   Next visit within 3 mo: Visit date not found  Next physical within 3 mo: Visit date not found  Prescriber OR PCP: Graeme Enrique MD  Last diagnosis associated with med order: 1. Hyperlipidemia  - simvastatin (ZOCOR) 40 MG tablet [Pharmacy Med Name: SIMVASTATIN 40MG TABLETS]; TAKE 1 TABLET BY MOUTH DAILY  Dispense: 90 tablet; Refill: 0    2. Essential hypertension  - ramipril (ALTACE) 10 MG capsule [Pharmacy Med Name: RAMIPRIL 10MG CAPSULES]; TAKE 1 CAPSULE BY MOUTH EVERY DAY  Dispense: 90 capsule; Refill: 0    If protocol passes may refill for 12 months if within 3 months of last provider visit (or a total of 15 months).             ramipril (ALTACE) 10 MG capsule [Pharmacy Med Name: RAMIPRIL 10MG CAPSULES] 90 capsule 0     Sig: TAKE 1 CAPSULE BY MOUTH EVERY DAY       Ace Inhibitors Refill Protocol Passed - 4/26/2019  3:51 AM        Passed - PCP or prescribing provider visit in past 12 months       Last office visit with prescriber/PCP: 4/16/2018 Graeme Enrique MD [place] same dept: Visit date not found OR same specialty: 10/29/2018 Addi Toure MD  Last physical: 5/17/2017 Last MTM visit: Visit date not found   Next visit within 3 mo: Visit date not  found  Next physical within 3 mo: Visit date not found  Prescriber OR PCP: Graeme Enrique MD  Last diagnosis associated with med order: 1. Hyperlipidemia  - simvastatin (ZOCOR) 40 MG tablet [Pharmacy Med Name: SIMVASTATIN 40MG TABLETS]; TAKE 1 TABLET BY MOUTH DAILY  Dispense: 90 tablet; Refill: 0    2. Essential hypertension  - ramipril (ALTACE) 10 MG capsule [Pharmacy Med Name: RAMIPRIL 10MG CAPSULES]; TAKE 1 CAPSULE BY MOUTH EVERY DAY  Dispense: 90 capsule; Refill: 0    If protocol passes may refill for 12 months if within 3 months of last provider visit (or a total of 15 months).             Passed - Serum Potassium in past 12 months     Lab Results   Component Value Date    Potassium 4.6 01/10/2019             Passed - Blood pressure filed in past 12 months     BP Readings from Last 1 Encounters:   01/10/19 130/80             Passed - Serum Creatinine in past 12 months     Creatinine   Date Value Ref Range Status   01/10/2019 0.87 0.70 - 1.30 mg/dL Final

## 2021-05-28 NOTE — PROGRESS NOTES
"On 05/13/19 - the charge nurse (PHYLICIA Caldwell RN) talked with Jacki at [facility] - and received approval from Dr. Bagley that the IgG is for 4 days (part this week, and part next week). Patient and his spouse (Marti) verbalize understanding.     Patient arrived ambulatory with the use of a cane. \"I am already walking better, and by last night the double vision was gone.\" Reviewed plan of care and today's treatment. Placed IV in the right arm - used NS as the primary IV solution, and administered famotidine 20 mg IVP; along with the oral premeds of acetaminophen and benadryl. The IgG 35 gms (day 2) infused over 2 hours 37 minutes. Patient was monitored throughout the infusion - refer to the vital signs tab for those results. Pt's blood pressure was elevated at completion - with resting quietly, the ending blood pressure was 163/81 HR 70 - 80. Ate 100% of lunch. Up to the bathroom independently x 1. Napped at intervals. Denies pain or a headache. At completion the IV was dc'd and site wrapped securely with gauze and coban.     Declined the AVS. An appointment schedule was given and explained to Marti. At 13:25 this writer took the pt via w/c in stable condition to awaiting vehicle. Patient plans to return on Wednesday for the third infusion.    Antonina Murillo RN  "

## 2021-05-28 NOTE — PROGRESS NOTES
"0850 saeed \"Prosper\" arrived A&Ox4 with his wife. Seated in chair #4. Pt confirms he is here for IVIG infusion #4 of 4 to treat Myasthenias Gravis. He has had the dx for a long time but reports worsening sx in the last few months. He had hip replacement in January and wife states he was \"doing great for a while\".  Pt reports typically he gets easily fatigued, takes several naps daily, sleeps a full night, diplopia in the evening, unsteady gait but stable with his cane, right side of body more weak than the left.   Today Prosper reports diplopia is gone, chewing has improved, his head is not as heavy, he feels stronger on his R side- not using his cane at home, fatigue has \"mildly\" improved. Also reports that Friday his appetite was poor, he was whispering, and slept a lot but then Saturday he was \"perky\".  He denies any recent illness/injury/surgery. POC/medication education reviewed, pt and wife stated understanding and agreed to plan.  PIV w ease R FA, excellent blood return, line easily flushed NS  Pre meds given as ordered.  IV Ig infused as ordered, titrated rate. Prosper tolerated IgG infusion w/o sxs adverse Rxn, line flushed NS40ml and pt monitored 30min post infusion. VSS, SBP somewhat eleveated, pt and wife were instructed to monitor BP at home and contact DrHoi if BP remains elevated or rises, and that if he develops any sx of adverse Rxn to seek immedicate medical attention-pt and wife stated understanding and agreed to plan. PIV dc'd, site covered w clean dressing/coban. Dc education/AVS reviewed, pt and wife stated understanding and that their needs were met today  1315 Prosper balderrama'd A&Ox4 with his wife.    "

## 2021-05-28 NOTE — TELEPHONE ENCOUNTER
RN cannot approve Refill Request    RN can NOT refill this medication PCP messaged that patient is overdue for Labs and Office Visit.       Kami Guo, [facility] Triage/Med Refill 5/10/2019    Requested Prescriptions   Pending Prescriptions Disp Refills     levothyroxine (SYNTHROID, LEVOTHROID) 75 MCG tablet [Pharmacy Med Name: LEVOTHYROXINE 0.075MG (75MCG) TABS] 90 tablet 0     Sig: TAKE 1 TABLET(75 MCG) BY MOUTH DAILY       Thyroid Hormones Protocol Failed - 5/10/2019  3:50 AM        Failed - TSH on file in past 12 months for patient age 12 & older     TSH   Date Value Ref Range Status   04/16/2018 3.36 0.30 - 5.00 uIU/mL Final                   Passed - Provider visit in past 12 months or next 3 months     Last office visit with prescriber/PCP: 4/16/2018 Graeme Enrique MD OR same dept: Visit date not found OR same specialty: 10/29/2018 Addi Toure MD  Last physical: 5/17/2017 Last MTM visit: Visit date not found   Next visit within 3 mo: Visit date not found  Next physical within 3 mo: Visit date not found  Prescriber OR PCP: Graeme Enrique MD  Last diagnosis associated with med order: 1. Hypothyroid  - levothyroxine (SYNTHROID, LEVOTHROID) 75 MCG tablet [Pharmacy Med Name: LEVOTHYROXINE 0.075MG (75MCG) TABS]; TAKE 1 TABLET(75 MCG) BY MOUTH DAILY  Dispense: 90 tablet; Refill: 0    If protocol passes may refill for 12 months if within 3 months of last provider visit (or a total of 15 months).

## 2021-05-29 NOTE — PROGRESS NOTES
[facility] Follow Up Note    Assessment/Plan:    1. Myasthenia gravis (H)  Patient has had 4 IgG infusions.  His symptoms are much better.  It sounds that infusions will be intermittent based on his symptoms.  Currently infusions has caused increase in his blood pressure.  I am not sure if he gets any saline with his medications.  Usually it is infused over 5 hours    2. Essential hypertension  Patient is normally on Altace once a day.  In the past he has had low sodium in his blood work.  Currently wife has been giving him Altace 10 mg twice a day and blood pressures at home has been in 150 range.  In the office today is normal.  Discussed that it my take additional 3 to 4 days for medication to equilibrate.  We will check potassium levels today.  Chronically he might stay on Altace twice a day if potassium levels are normal.  Also gave him prescription for amlodipine to use in addition as needed if his blood pressures persistently above 160.  He can also use it on the days of his infusions in the future.  Because of history of hyponatremia I did not give him any hydrochlorothiazide prescription.  I recommended that he follows up with his PCP in 3 weeks.  - Basic Metabolic Panel  - amLODIPine (NORVASC) 5 MG tablet; Take 1 tablet (5 mg total) by mouth daily. As needed for b/p >160.  Dispense: 30 tablet; Refill: 3  - ramipril (ALTACE) 10 MG capsule; Take 1 capsule (10 mg total) by mouth 2 (two) times a day.  Dispense: 180 capsule; Refill: 2      Margie Bruno MD    Chief Complaint:  Chief Complaint   Patient presents with     Follow-up       History of Present Illness:    El is a 83 y.o. male, patient of Dr. Yap, with history of myasthenia gravis, right hip arthritis, high blood pressure, hypothyroidism, BPH and macular degeneration, hearing deficit.  Is currently accompanied by his wife in follow-up for elevated blood pressure.    Patient was recently diagnosed with myasthenia gravis by  his ophthalmologist.  Since he has been experiencing significant weakness and muscle fatigue and problems with ambulation she was started on IgG infusions and had the first series of 4 infusions last week.  Infusion will take 5 hours.  1 bag of medicine would be infuse each time.  I am not sure if normally it is diluted in saline.  Patient denies any worsening swelling after infusion but blood pressure during infusion would go up into 180s.  Normally he is on Altace blood pressure medicine once a day.  For the last 3 days his wife has been giving him Altace twice a day and the blood pressure has come down to 150s.  We discussed to continue Altace twice a day and will check his potassium level.  Also gave him prescription for amlodipine to use as needed if blood pressure is still elevated 2 hours after taking his Altace.  In the past it looks like he has had several episodes of hyponatremia so I did not put him on any diuretic today.    Review of Systems:  A comprehensive review of systems was performed and was otherwise negative and currently he denies any chest pains or palpitations.  No lower extremity swelling    PFSH:  Social History: Reviewed  Social History     Tobacco Use   Smoking Status Former Smoker   Smokeless Tobacco Never Used     Social History     Social History Narrative     Not on file       Past History: Reviewed  Current Outpatient Medications   Medication Sig Dispense Refill     aspirin 81 MG EC tablet        cholecalciferol, vitamin D3, 2,000 unit capsule Take 1,000 Units by mouth daily.       levothyroxine (SYNTHROID, LEVOTHROID) 75 MCG tablet TAKE 1 TABLET(75 MCG) BY MOUTH DAILY 90 tablet 3     multivit with minerals/lutein (MULTIVITAMIN 50 PLUS ORAL) Take by mouth daily.       polyethylene glycol (MIRALAX) 17 gram packet Take 17 g by mouth 2 (two) times a day.       predniSONE (DELTASONE) 20 MG tablet Take 40 mg by mouth daily.       pyridostigmine (MESTINON) 60 mg tablet   3     ramipril  "(ALTACE) 10 MG capsule Take 1 capsule (10 mg total) by mouth 2 (two) times a day. 180 capsule 2     simvastatin (ZOCOR) 40 MG tablet Take 1 tablet (40 mg total) by mouth daily. 90 tablet 2     tamsulosin (FLOMAX) 0.4 mg Cp24 TAKE 1 CAPSULE BY MOUTH EVERY DAY 90 capsule 3     vit A-vit C-vit E-zinc-copper (EYE VITAMIN AND MINERALS) 7,160-113-100 unit-mg-unit Tab Take 1 tablet by mouth daily. 90 tablet 3     amLODIPine (NORVASC) 5 MG tablet Take 1 tablet (5 mg total) by mouth daily. As needed for b/p >160. 30 tablet 3     No current facility-administered medications for this visit.        Family History: Reviewed    Physical Exam:    Vitals:    05/24/19 1111   BP: 128/74   Patient Site: Left Arm   Patient Position: Sitting   Cuff Size: Adult Large   Pulse: (!) 102   SpO2: 97%   Weight: 213 lb (96.6 kg)   Height: 5' 7.25\" (1.708 m)     Wt Readings from Last 3 Encounters:   05/24/19 213 lb (96.6 kg)   05/20/19 215 lb (97.5 kg)   05/13/19 215 lb (97.5 kg)     Body mass index is 33.11 kg/m .    Constitutional:  Reveals a pleasant male.  Vitals:  Per nursing notes.  HEENT:No cervical LAD, no thyromegaly,  conjunctiva is pink, no scleral icterus, TMs are visualized and normal bl, oropharynx is clear, no exudates,   Cardiac:  Regular rate and rhythm,no murmurs, rubs, or gallops. Carotids without bruits. Legs without edema. Palpation of the radial pulse regular.  Lungs: Clear to auscultation bl.  Respiratory effort normal.  Abdomen:positive BS, soft, nontender, nondistended.  No hepato-splenomagaly  Skin:   Without rash, bruise, or palpable lesions.  Rheumatologic: Normal joints and nails of the hands.  Neurologic:  Cranial nerves II-XII intact.     Psychiatric: affect appropriate, memory intact.       Data Review:    Analysis and Summary of Old Records (2): yes      Records Requested (1): no      Other History Summarized (from other people in the room) (2): wife    Radiology Tests Summarized (XRAY/CT/MRI/DXA) (1): no    Labs " Reviewed (1): yes    Medicine Tests Reviewed (EKG/ECHO/COLONOSCOPY/EGD) (1): no    Independent Review of EKG or X-RAY (2): no

## 2021-05-29 NOTE — TELEPHONE ENCOUNTER
Refill Approved    Rx renewed per Medication Renewal Policy. Medication was last renewed on 6/4/18, last OV 5/24/19.    Jaimie Sanches, [facility] Triage/Med Refill 5/26/2019     Requested Prescriptions   Pending Prescriptions Disp Refills     tamsulosin (FLOMAX) 0.4 mg cap [Pharmacy Med Name: TAMSULOSIN 0.4MG CAPSULES] 90 capsule 0     Sig: TAKE 1 CAPSULE BY MOUTH EVERY DAY       Alfuzosin/Tamsulosin/Silodosin Refill Protocol  Passed - 5/26/2019  3:51 AM        Passed - PCP or prescribing provider visit in past 12 months       Last office visit with prescriber/PCP: 4/16/2018 Graeme Enrique MD OR same dept: Visit date not found OR same specialty: 5/24/2019 Margie Bruno MD  Last physical: 5/17/2017 Last MTM visit: Visit date not found   Next visit within 3 mo: Visit date not found  Next physical within 3 mo: Visit date not found  Prescriber OR PCP: Graeme Enrique MD  Last diagnosis associated with med order: 1. BPH (benign prostatic hyperplasia)  - tamsulosin (FLOMAX) 0.4 mg cap [Pharmacy Med Name: TAMSULOSIN 0.4MG CAPSULES]; TAKE 1 CAPSULE BY MOUTH EVERY DAY  Dispense: 90 capsule; Refill: 0    If protocol passes may refill for 12 months if within 3 months of last provider visit (or a total of 15 months).

## 2021-05-29 NOTE — PATIENT INSTRUCTIONS - HE
1. Continue Altace twice a day . Goal b/p closer to 140. It may take additional 4 days for medication to equilibrate in the system. We will check potassium levels today.    For persistent high b/p (>160) after taking Altace ,  can Use Amlodipine in addition as needed (may need it on the day of infusions).

## 2021-05-29 NOTE — TELEPHONE ENCOUNTER
Has MG.  HAs had 4 igg infusions.  Last one Monday.  During infusions BP would go up.  Tuesday 196/114 9pm and was given another ramipril.    Today /91, no current symptoms of dizziness    Took ramapril at 7am this morning.    Wife calling for BP follow up appointment.  Last seen January 2019.    Concepcion Rosenberg, RN, [facility] Nurse Triage/Med Refills RN     Reason for Disposition    Systolic BP >= 140 OR Diastolic >= 90, and is taking BP medications    Protocols used: HIGH BLOOD PRESSURE-A-OH

## 2021-05-31 VITALS — BODY MASS INDEX: 34.07 KG/M2 | HEIGHT: 68 IN | WEIGHT: 224.8 LBS

## 2021-05-31 NOTE — TELEPHONE ENCOUNTER
FYI - Status Update  Who is Calling: Spouse  Update: Patient is in [facility] since last Thursday; patient had diverticulitis infection and is septic, per spouse.  Okay to leave a detailed message?:  No return call needed

## 2021-06-01 ENCOUNTER — RECORDS - HEALTHEAST (OUTPATIENT)
Dept: ADMINISTRATIVE | Facility: CLINIC | Age: 86
End: 2021-06-01

## 2021-06-01 VITALS — BODY MASS INDEX: 32.99 KG/M2 | WEIGHT: 217 LBS

## 2021-06-01 NOTE — ANESTHESIA POSTPROCEDURE EVALUATION
Patient: Charles E Aase  CYSTOSCOPY  Anesthesia type: MAC    Patient location: PACU  Last vitals:   Vitals Value Taken Time   /57 9/7/2019  7:03 PM   Temp 36.8  C (98.2  F) 9/7/2019  7:03 PM   Pulse 67 9/7/2019  7:12 PM   Resp 24 9/7/2019  7:12 PM   SpO2 91 % 9/7/2019  7:12 PM   Vitals shown include unvalidated device data.  Post vital signs: stable  Level of consciousness: sedated  Post-anesthesia pain: pain controlled  Post-anesthesia nausea and vomiting: no  Pulmonary: on vent.  Cardiovascular: stable and blood pressure at baseline  Hydration: adequate  Anesthetic events: no    QCDR Measures:  ASA# 11 - Awilda-op Cardiac Arrest: ASA11B - Patient did NOT experience unanticipated cardiac arrest  ASA# 12 - Awilda-op Mortality Rate: ASA12B - Patient did NOT die  ASA# 13 - PACU Re-Intubation Rate: ASA13B - Patient did NOT require a new airway mgmt  ASA# 10 - Composite Anes Safety: ASA10A - No serious adverse event    Additional Notes:

## 2021-06-01 NOTE — ANESTHESIA PROCEDURE NOTES
Emergent Intubation  Date/Time: 9/5/2019 10:02 AM    Performing CRNA: Rahul Webb CRNA  Indications: cardio/pulmonary arrest    Medications Administered  Etomidate (AMIDATE) injection, 10 mgMedication administration time:9/5/2019 10:02 AMRoute: oral  Technique: fiberoptic assisted (Glidescope go #4)  Tube size: 8.0 mm  Tube type: cuffed  Cuff inflated: yes  Level of Difficulty: 0ETT to lip: 24 cm  Tube secured with: ETT veras  ETCO2 = Yes  Breath sounds: equal  Comments: CPR in progress. Etomidate given after intubation at the request of the attending physician.

## 2021-06-01 NOTE — ANESTHESIA CARE TRANSFER NOTE
Last vitals:   Vitals:    09/07/19 1903   BP: 112/57   Pulse: 78   Resp:    Temp: 36.8  C (98.2  F)   SpO2: 98%     Patient's level of consciousness is unresponsive  Spontaneous respirations: no: ventilator   Maintains airway independently: no: intubated  Dentition unchanged: yes  Oropharynx: endotracheal tube in place    QCDR Measures:  ASA# 20 - Surgical Safety Checklist: WHO surgical safety checklist completed prior to induction    PQRS# 430 - Adult PONV Prevention: 4558F - Pt received => 2 anti-emetic agents (different classes) preop & intraop  ASA# 8 - Peds PONV Prevention: NA - Not pediatric patient, not GA or 2 or more risk factors NOT present  PQRS# 424 - Awilda-op Temp Management: 4559F - At least one body temp DOCUMENTED => 35.5C or 95.9F within required timeframe  PQRS# 426 - PACU Transfer Protocol: - Transfer of care checklist used  ASA# 14 - Acute Post-op Pain: ASA14B - Patient did NOT experience pain >= 7 out of 10

## 2021-06-01 NOTE — ANESTHESIA PREPROCEDURE EVALUATION
Anesthesia Evaluation      Patient summary reviewed     Airway   Comment: intubated   Pulmonary      ROS comment: Intubated, ventilated  PE comment: On vent.                         Cardiovascular   (+) hypertension, ,        PE comment: On pressors, s/p cardiac arrest,     Neuro/Psych    (+) neuromuscular disease,      Endo/Other    (+) hypothyroidism,      GI/Hepatic/Renal    (+)   chronic renal disease ARF,           Dental                         Anesthesia Plan  Planned anesthetic: MAC  Gen prn  ASA 4   Induction: intravenous   Anesthetic plan and risks discussed with: spouse and child/children      DNR/DNI status   DNR/DNI status discussed with spouse.

## 2021-06-02 VITALS — WEIGHT: 228.1 LBS | BODY MASS INDEX: 35.8 KG/M2 | HEIGHT: 67 IN

## 2021-06-02 VITALS — WEIGHT: 225.9 LBS | BODY MASS INDEX: 34.35 KG/M2

## 2021-06-03 VITALS — WEIGHT: 215 LBS | BODY MASS INDEX: 33.74 KG/M2 | HEIGHT: 67 IN

## 2021-06-03 VITALS
BODY MASS INDEX: 38.58 KG/M2 | WEIGHT: 245.8 LBS | BODY MASS INDEX: 38.58 KG/M2 | WEIGHT: 245.8 LBS | HEIGHT: 67 IN | HEIGHT: 67 IN | BODY MASS INDEX: 38.58 KG/M2 | BODY MASS INDEX: 38.58 KG/M2 | HEIGHT: 67 IN | HEIGHT: 67 IN | WEIGHT: 245.8 LBS | WEIGHT: 245.8 LBS

## 2021-06-03 VITALS — BODY MASS INDEX: 33.43 KG/M2 | WEIGHT: 213 LBS | HEIGHT: 67 IN

## 2021-06-03 VITALS — BODY MASS INDEX: 33.42 KG/M2 | WEIGHT: 215 LBS

## 2021-06-10 NOTE — PROGRESS NOTES
ASSESSMENT:  1. Essential hypertension  WEll controlled and continue same.  Try to lose weight. Stay active.  - Comprehensive Metabolic Panel  - HM2(CBC w/o Differential)  - Urinalysis    2. Hypothyroidism  Check lab and see if changes needed. No sx.  - Thyroid Stimulating Hormone (TSH)    3. Pure hypercholesterolemia  Well controlled and checked last fall. Really good.   - Comprehensive Metabolic Panel    4. BPH (benign prostatic hyperplasia)  Doing very well on tamsulosin. He elects not to have me doing rectal exam at this age.  I think this is reasonable.     PLAN:  Patient Instructions   Watch your calorie intake and stay active.       Orders Placed This Encounter   Procedures     Comprehensive Metabolic Panel     HM2(CBC w/o Differential)     Urinalysis     Thyroid Stimulating Hormone (TSH)     Medications Discontinued During This Encounter   Medication Reason     sildenafil (VIAGRA) 100 MG tablet Therapy completed       Return in about 6 months (around 11/17/2017) for HTN.    ASSESSED PROBLEMS:  Problem List Items Addressed This Visit     Hypothyroidism    Relevant Orders    Thyroid Stimulating Hormone (TSH)    Pure hypercholesterolemia    Relevant Orders    Comprehensive Metabolic Panel    Hypertension - Primary    Relevant Orders    Comprehensive Metabolic Panel    HM2(CBC w/o Differential)    Urinalysis    BPH (benign prostatic hyperplasia)          CHIEF COMPLAINT:  Chief Complaint   Patient presents with     multiple medical concerns       HISTORY OF PRESENT ILLNESS:  Charles E Aase is a 81 y.o. male presenting to the clinic today for multiple medical concerns.     BPH: He is currently taking tamsulosin daily; it has helped him to empty his bladder effectively.     Hypertension: He had a blood pressure of 124/64 in clinic today. He is currently taking 10 mg of ramipril once daily and has been tolerating the medication. He is also taking aspirin daily and denies any bleeding.    Hyperlipidemia: He is  "currently taking simvastatin daily and has been tolerating the medication.     Hypothyroidism: He is currently taking Synthroid daily and has been tolerating the medication. His last TSH was 4.59 as of April 2016.     Macular Degeneration: He has an eye exam completed every 6-8 weeks for an injection in his eye. This is treatment for his macular degeneration.    Health maintenance: He had a colonoscopy completed in 11/12/13 and it was normal; he no longer requires colonoscopy screening. All of his immunizations are up to date at this time.     REVIEW OF SYSTEMS:   He states that he does not want a bone density test at this time.   See completed form B. Comprehensive review of systems negative except as noted above.    PFSH:  History reviewed. No pertinent past medical history.  Past Surgical History:   Procedure Laterality Date     TRANSURETHRAL RESECTION OF PROSTATE  1991     Family History   Problem Relation Age of Onset     Adopted: Yes     Social History     Social History     Marital status:      Spouse name: N/A     Number of children: N/A     Years of education: N/A     Occupational History     Not on file.     Social History Main Topics     Smoking status: Never Smoker     Smokeless tobacco: Not on file     Alcohol use Not on file     Drug use: Not on file     Sexual activity: Not on file     Other Topics Concern     Not on file     Social History Narrative   Social: He is going to be celebrating \"the big 200\" with his wife this year; he is turning 82, she is turning 72, and they are celebrating 50 years of marriage!!!!    VITALS:  Vitals:    05/17/17 0908   BP: 124/64   Pulse: 65   Weight: (!) 224 lb 12.8 oz (102 kg)   Height: 5' 8\" (1.727 m)     Wt Readings from Last 3 Encounters:   05/17/17 (!) 224 lb 12.8 oz (102 kg)   04/15/16 213 lb 4.8 oz (96.8 kg)   12/15/15 210 lb (95.3 kg)     Body mass index is 34.18 kg/(m^2).  The following high BMI interventions were performed this visit: weight " monitoring    PHYSICAL EXAM:  General Appearance: Alert, cooperative, no distress, appears stated age.  HEENT: EMOI, fundi not observed, TMs normal, mouth and throat without lesions. 3 mm ecchymosis on tongue. Looking like traumatic injury.   Neck: Supple without adenopathy or thyromegaly.  Back: No CVA tenderness or spinous process pain.  Lungs: Clear to auscultation bilaterally, good air movement.  Heart: Regular rate and rhythm, S1 and S2 normal, no murmur or bruit.  Abdomen: Soft, non-tender, no HSM or masses.  Genitourinary: Normal male, testes descended without masses or hernias.  Rectal exam: Not done, patient elected to not do.  Musculoskeletal: No gross abnormalities.  Extremities: No CCE, pulses II/IV and symmetric,   Skin: No worrisome lesions noted. Red blanching spots on shins bilateral, looking almost like little AVM's and not really petechiae.  Lymph nodes: Cervical, supraclavicular, groin, and axillary nodes normal.  Neurologic: CNII-XII intact, strength V/V and symmetric, DTRs II/IV and symmetric, sensory grossly intact  Psychiatric:  He has a normal mood and affect.     Notes Reviewed, additional history from source other than patient (2 TOTAL): Reviewed physical from 4/15/16; hypothyroid, hypertension.    Accessed Care Everywhere, Requested Records, Consult with Physician (1 TOTAL): None.     Radiology tests summarized or ordered (XR, CT, MRI, DXA, US) (1 TOTAL): None.    Labs reviewed or ordered (1 TOTAL):  Ordered CMP, HM2, UA, and TSH labs today.    Medicine tests reviewed or ordered (ECG, echocardiogram, colonoscopy, EGD, venous US) (1 TOTAL): None.    Independent review of ECG or XR (2 EACH): None.    The visit lasted a total of 10 minutes face to face with the patient. Over 50% of the time was spent counseling and educating the patient about health maintenance.    Dilia LEE, am scribing for and in the presence of, Dr. Enrique.    ROSA, Dr. Enrique, personally performed the services  described in this documentation, as scribed by Dilia Yarbrough in my presence, and it is both accurate and complete.    MEDICATIONS:  Current Outpatient Prescriptions   Medication Sig Dispense Refill     aspirin 81 MG EC tablet        cholecalciferol, vitamin D3, 3,000 unit Tab Take 1 tablet by mouth daily.       levothyroxine (SYNTHROID, LEVOTHROID) 50 MCG tablet Take 1 tablet (50 mcg total) by mouth Daily at 6:00 am. 90 tablet 0     ramipril (ALTACE) 10 MG capsule Take 1 capsule (10 mg total) by mouth daily. 90 capsule 1     simvastatin (ZOCOR) 40 MG tablet Take 1 tablet (40 mg total) by mouth daily. 90 tablet 2     tamsulosin (FLOMAX) 0.4 mg Cp24 Take 1 capsule (0.4 mg total) by mouth daily. 90 capsule 2     No current facility-administered medications for this visit.        Total data points: 3

## 2021-06-12 NOTE — PROGRESS NOTES
Clinic Note    Assessment:     Assessment and Plan:  1. Hypothyroidism  We will check TSH today and if at the high end of normal or above normal will increase his dose to 75 mcg.  Certainly would be a safe change.  Follow-up then several months later perhaps for his physical for repeat.  - Thyroid Stimulating Hormone (TSH)    2. Essential hypertension  Well-controlled on current meds and renal function is normal.  Labs are reviewed from his last visit in May.  Things are good otherwise.  Continue same.  Follow-up 6 months as needed       Patient Instructions   Please note that if over the counter medications were taken off of your medication list, it is not because you are being asked to stop taking them.  does not want all of the over the counter medications on your medication list, as it bogs down the list.     Flu shot this fall!!       No Follow-up on file.         Subjective:      Charles E Aase is a 82 y.o. male comes in for follow-up of his thyroid and blood pressure.  His TSH has been at the upper end of normal for a while and then last visit when little bit higher at 6-1/2.  He is not aware of changes of the generic pharmacy or dosing.  He has not changed his dose.  He is taking 50 mcg daily which is a low dose.  No signs or symptoms including bowel blood pressure pulse fatigue etc.  Blood pressures under excellent control and no trouble with his medicines otherwise.    The following portions of the patient's history were reviewed and updated as appropriate: Past medical history, allergies, medications, TSH over the last year    Review of Systems:    Completely negative except as above which was also negative  History   Smoking Status     Never Smoker   Smokeless Tobacco     Never Used         Objective:     Vitals:    08/15/17 1139   BP: 124/76   Pulse: 72       Exam:  Patient looks well in no acute distress.  Vital signs stable looking good  Heart sounds normal  Extremities no edema  Gait  stable    Patient Active Problem List   Diagnosis     Macular Degeneration     Hypothyroidism     Pure hypercholesterolemia     Hypertension     BPH (benign prostatic hyperplasia)     Current Outpatient Prescriptions   Medication Sig Dispense Refill     aspirin 81 MG EC tablet        levothyroxine (SYNTHROID, LEVOTHROID) 50 MCG tablet Take 1 tablet (50 mcg total) by mouth Daily at 6:00 am. 90 tablet 0     ramipril (ALTACE) 10 MG capsule Take 1 capsule (10 mg total) by mouth daily. 90 capsule 2     simvastatin (ZOCOR) 40 MG tablet Take 1 tablet (40 mg total) by mouth daily. 90 tablet 2     tamsulosin (FLOMAX) 0.4 mg Cp24 Take 1 capsule (0.4 mg total) by mouth daily. 90 capsule 2     No current facility-administered medications for this visit.          Graeme Enrique    8/15/2017

## 2021-06-14 NOTE — PROGRESS NOTES
Clinic Note    Assessment:     Assessment and Plan:  1. Hypertension  Blood pressure is excellent today and is doing well on his current dose of meds.  No change.  Lab work is been good.  We will plan on follow-up in April.    2. Hypothyroidism  We raised his levothyroxine and will check a TSH again today to see if we need to change it again.  - Thyroid Stimulating Hormone (TSH)       Patient Instructions   Have a great winter and see you in April     Return in about 6 months (around 5/20/2018) for Annual physical.         Subjective:      Charles E Aase is a 82 y.o. male comes in for follow-up of his hypertension and hypothyroid.  Blood pressure first was a little elevated repeated by me was 110/70.  Patient looks great no problems.  No symptoms at all that he wants to discuss today.  No issues with the medications that he is on.    The following portions of the patient's history were reviewed and updated as appropriate: Past medical history, allergies, meds, labs over the past year showing normal renal renal function    Review of Systems:    As above all good men no symptoms  History   Smoking Status     Never Smoker   Smokeless Tobacco     Never Used         Objective:     Vitals:    11/20/17 0952 11/20/17 1032   BP: 136/86 110/70   Pulse: 78        Exam:  Vital signs stable patient looks well  Heart sounds are normal  Lungs are clear  Extremities without edema  Psych-affect normal      Patient Active Problem List   Diagnosis     Macular Degeneration     Hypothyroidism     Pure hypercholesterolemia     Hypertension     BPH (benign prostatic hyperplasia)     Current Outpatient Prescriptions   Medication Sig Dispense Refill     aspirin 81 MG EC tablet        levothyroxine (SYNTHROID, LEVOTHROID) 75 MCG tablet Take 1 tablet (75 mcg total) by mouth daily. 90 tablet 1     ramipril (ALTACE) 10 MG capsule Take 1 capsule (10 mg total) by mouth daily. 90 capsule 2     simvastatin (ZOCOR) 40 MG tablet Take 1 tablet (40 mg  total) by mouth daily. 90 tablet 1     tamsulosin (FLOMAX) 0.4 mg Cp24 Take 1 capsule (0.4 mg total) by mouth daily. 90 capsule 1     No current facility-administered medications for this visit.          Graeme Enrique    11/20/2017

## 2021-06-16 PROBLEM — E78.5 HYPERLIPIDEMIA LDL GOAL <130: Status: ACTIVE | Noted: 2018-01-01

## 2021-06-16 PROBLEM — K57.92 DIVERTICULITIS: Status: ACTIVE | Noted: 2019-01-01

## 2021-06-16 PROBLEM — D64.9 ANEMIA: Status: ACTIVE | Noted: 2019-01-01

## 2021-06-16 PROBLEM — K92.1 MELENA: Status: ACTIVE | Noted: 2019-01-01

## 2021-06-17 NOTE — PROGRESS NOTES
ASSESSMENT/PLAN:  1. Hypertension  Reasonably well-controlled and no changes made today.  - HM2(CBC w/o Differential)  - Urinalysis  - Comprehensive Metabolic Panel    2. Hypothyroidism  Iron replacement will check TSH today.  - Thyroid Stimulating Hormone (TSH)    3. Pure hypercholesterolemia  Doing well on current meds but is simvastatin 40.  Will check lipids and make sure that it is low enough and his CMP is okay  - Lipid Cascade  - Comprehensive Metabolic Panel      Patient Instructions   Please note that if over the counter medications were taken off of your medication list, it is not because you are being asked to stop taking them.  does not want all of the over the counter medications on your medication list, as it bogs down the list.     Please call your insurance company and discuss Shingrix vaccine. This is a series of 2 injections that MUST be given 2-6 months apart and will cost around $287.00 here at [facility].    Continue on your current medications.       Return in about 6 months (around 10/16/2018) for Annual physical.    CHIEF COMPLAINT:  Chief Complaint   Patient presents with     Hypertension       HISTORY OF PRESENT ILLNESS:  Charles E Aase is a 82 y.o. male presenting to the clinic today to follow up on his hypertension.     Hypertension: He was last seen in the clinic on 11/20/2017 and no changes were made to his blood pressure medications at that time. He continues to take ramipril 10 mg daily, and his blood pressure is in an okay range today.     Hypercholesterolemia: He is taking 40 mg of simvastatin daily and tolerates it well.     BPH: He continues to take tamsulosin daily and thinks it helps his urinary symptoms.     Hypothyroidism: His last TSH was 4.86 on 11/20/2017. He will have this checked again today.     REVIEW OF SYSTEMS:   He denies chest pain, pressure, or tightness in the chest. He has not had any trouble with his breathing. He has not had any lower  extremity swelling. Comprehensive review of systems negative except as noted above.    PFSH:  Reviewed, as below.     TOBACCO USE:  History   Smoking Status     Never Smoker   Smokeless Tobacco     Never Used       VITALS:  Vitals:    04/16/18 0936   BP: 132/86   Pulse: 72     Wt Readings from Last 3 Encounters:   03/27/18 217 lb (98.4 kg)   05/17/17 (!) 224 lb 12.8 oz (102 kg)   04/15/16 213 lb 4.8 oz (96.8 kg)     There is no height or weight on file to calculate BMI.    PHYSICAL EXAM:  General Appearance: Alert, cooperative, no distress, appears stated age.  Lungs: Clear to auscultation bilaterally, good air movement.  Heart: Regular rate and rhythm, S1 and S2 normal, no murmur or bruit.  Abdomen: Soft, non-tender, no HSM or masses.  Extremities: No lower extremity edema.   Psychiatric: he has a normal mood and affect.     Notes Reviewed, additional history from source other than patient (2 TOTAL): None.    Accessed Care Everywhere, Requested Records, Consult with Physician (1 TOTAL): None.     Radiology tests summarized or ordered (XR, CT, MRI, DXA, US): None.    Labs reviewed or ordered (1 TOTAL): Labs from 5/17/2017 and 11/20/2017 reviewed. Labs ordered.     Medicine tests reviewed or ordered (ECG, echocardiogram, colonoscopy, EGD, venous US) (1 TOTAL): None.    Independent review of ECG or XR (2 EACH): None.    MEDICATIONS:  Current Outpatient Prescriptions   Medication Sig Dispense Refill     aspirin 81 MG EC tablet        levothyroxine (SYNTHROID, LEVOTHROID) 75 MCG tablet TAKE 1 TABLET BY MOUTH DAILY 90 tablet 0     ramipril (ALTACE) 10 MG capsule Take 1 capsule (10 mg total) by mouth daily. 90 capsule 2     simvastatin (ZOCOR) 40 MG tablet Take 1 tablet (40 mg total) by mouth daily. 90 tablet 1     tamsulosin (FLOMAX) 0.4 mg Cp24 TAKE 1 CAPSULE BY MOUTH EVERY DAY 90 capsule 0     No current facility-administered medications for this visit.        The visit lasted a total of 7 minutes face to face with the  patient. Over 50% of the time was spent counseling and educating the patient about his hypertension.    I, Darwin Martin, am scribing for and in the presence of, Dr. Enrique.    I, Dr. Enrique, personally performed the services described in this documentation, as scribed by Darwin Martin in my presence, and it is both accurate and complete.    Dragon dictation was used for this note.  Speech recognition errors are a possibility.      Total data points: 1

## 2021-06-19 NOTE — LETTER
Letter by Angelica Holder RN at      Author: Angelica Holder RN Service: -- Author Type: --    Filed:  Encounter Date: 9/9/2019 Status: (Other)       [facility]aleb "[employer]  [facility] [place] [phone]        Charles E Aase  [address]  [place] [address]    Dear El    We have reviewed the Health Care Directive dated 4-5-15 which you presented for addition to   your medical record. Unfortunately, our review indicates the document is not legally valid for   the following reason(s): Your signature was not witnessed or notarized.  In order to create a legal document you will need to have your signature witnessed by 2 people or notarized. Notaries and witnesses cannot be named as your health care agents per state law. In addition, only one of your witnesses can be employed by our health care system.    We greatly value the opportunity to assist you in documenting your choices and to honor your   wishes. We apologize for any inconvenience. We have several options to assist you in updating   your document so it meets legal requirements.       Health Care Directives and Advance Care Planning resources can be viewed and printed   for free at our web site:[email or web address].       COPIES of completed Health Care Directives can be brought or mailed to any of our   locations, including the address listed below. You can also email a copy to [email or web address] .       For additional assistance or questions you can email us at [email or web address] or call [phone]      Sincerely,     [facility]  [facility] and [address] [address]   Email us: [email or web address] Call us: [phone]  Visit at: [email or web address]

## 2021-06-21 NOTE — PROGRESS NOTES
ASSESSMENT/PLAN:    1. Myasthenia gravis (H)  Onset with diplopia, seen by Dr. Bagley.  Doing well with mestinon.      2. Hip pain, right  Osteoarthritis right hip, severe, Xray is more severe than his symptoms.  Also moderate on the left, which is not symptomatic.  We discussed the pros and cons of considering surgery.  Referred to orthopedic surgery.    - XR Hip Right 2 or More VWS; Future  - Ambulatory referral to Orthopedic Surgery    3. Hypertension  Controlled with current ramipril    4. Acquired hypothyroidism  On replacement    5. Benign prostatic hyperplasia with urinary hesitancy  Stable, has had TUR    6. Macular Degeneration  On current treatment.  Preservision is refilled.     CHIEF COMPLAINT:  Chief Complaint   Patient presents with     Establish Care     HISTORY OF PRESENT ILLNESS:  El is a 83 y.o. male presenting to the clinic today to establish care.  He is feeling overall well.  Mestinon has resolved his diplopia and he has follow up with Dr. Bagley. Gait and balance are a bit unsteady but not severely so.  He has been having pain in the right hip.  This is mostly with weight bearing.  Not new but worsening.  Mild symptoms on the left.  His BPH is stable with nocturia and hesitancy.  No chest pain or dyspnea, or fever, or unusual cough.  Getting eye treatment for macular degeneration.     REVIEW OF SYSTEMS:   Constitutional: no fever, chills, or sweats  Respiratory: No wheezes, cough, shortness of breath  Cardiovascular: No chest pain or palpitations  Gastrointestinal: No nausea, vomiting, diarrhea, dyspepsia, or pain  Genitourinary: see HPI  Neurological: see HPI  All other systems on reveiw are negative.    PFSH:    History   Smoking Status     Former Smoker   Smokeless Tobacco     Never Used     Family History   Problem Relation Age of Onset     Adopted: Yes     Social History     Social History     Marital status:      Spouse name: N/A     Number of children: 3     Years of education: N/A      Occupational History     Not on file.     Social History Main Topics     Smoking status: Former Smoker     Smokeless tobacco: Never Used     Alcohol use No      Comment: occ     Drug use: No     Sexual activity: Not on file     Other Topics Concern     Not on file     Social History Narrative     Past Surgical History:   Procedure Laterality Date     TRANSURETHRAL RESECTION OF PROSTATE  1991     No Known Allergies    Active Ambulatory Problems     Diagnosis Date Noted     Macular Degeneration      Hypothyroidism      Hypertension      BPH (benign prostatic hyperplasia)      Hyperlipidemia LDL goal <130 10/29/2018     Myasthenia gravis (H)      Resolved Ambulatory Problems     Diagnosis Date Noted     Pure hypercholesterolemia      Male Erectile Disorder      Diarrhea      Benign prostatic hyperplasia      Past Medical History:   Diagnosis Date     Benign prostatic hyperplasia      Hyperlipidemia LDL goal <130 10/29/2018     Hypertension      Hypothyroidism      Myasthenia gravis (H)      VITALS:  Vitals:    10/29/18 1001   BP: 138/78   Patient Site: Left Arm   Patient Position: Sitting   Cuff Size: Adult Large   Pulse: 62   SpO2: 94%   Weight: (!) 225 lb 14.4 oz (102.5 kg)     Wt Readings from Last 3 Encounters:   10/29/18 (!) 225 lb 14.4 oz (102.5 kg)   03/27/18 217 lb (98.4 kg)   05/17/17 (!) 224 lb 12.8 oz (102 kg)     Body mass index is 34.35 kg/(m^2).    PHYSICAL EXAM:  General Appearance: In no acute distress  /78 (Patient Site: Left Arm, Patient Position: Sitting, Cuff Size: Adult Large)  Pulse 62  Wt (!) 225 lb 14.4 oz (102.5 kg)  SpO2 94%  BMI 34.35 kg/m2  NECK:  supple, without adenopathy or thryroid enlargement  RESPIRATORY: Clear to auscultation  CARDIOVASCULAR: S1, S2, without murmur   ABDOMEN: soft, flat, and non-tender, without mass, rebound, or guarding  RECTAL: deferred  GENITOURINARY: deferred  MUSCULOSKELETAL: marked reduction in external rotation right hip.   PERIPHERAL PULSES:   diminished, mild edema    ADDITIONAL HISTORY SUMMARIZED (2): None.  DECISION TO OBTAIN EXTRA INFORMATION (1): None.   RADIOLOGY TESTS (1): None.  LABS (1): hip xray  MEDICINE TESTS (1): None.  INDEPENDENT REVIEW (2 each): personally viewed and interpreted hip xrays and reviewed with the patient.      Current Outpatient Prescriptions   Medication Sig Dispense Refill     aspirin 81 MG EC tablet        cholecalciferol, vitamin D3, 2,000 unit capsule Take 1,000 Units by mouth daily.       levothyroxine (SYNTHROID, LEVOTHROID) 75 MCG tablet Take 1 tablet (75 mcg total) by mouth daily. 90 tablet 3     polyethylene glycol (MIRALAX) 17 gram packet Take 17 g by mouth 2 (two) times a day.       pyridostigmine (MESTINON) 60 mg tablet   3     ramipril (ALTACE) 10 MG capsule TAKE 1 CAPSULE BY MOUTH EVERY DAY 90 capsule 3     simvastatin (ZOCOR) 40 MG tablet TAKE 1 TABLET BY MOUTH DAILY 90 tablet 3     tamsulosin (FLOMAX) 0.4 mg Cp24 TAKE 1 CAPSULE BY MOUTH EVERY DAY 90 capsule 3     vit A-vit C-vit E-zinc-copper (EYE VITAMIN AND MINERALS) 7,160-113-100 unit-mg-unit Tab Take 1 tablet by mouth daily. 90 tablet 3     No current facility-administered medications for this visit.

## 2021-06-23 NOTE — PROGRESS NOTES
Preoperative Exam    Scheduled Procedure: right hip surgery  Surgery Date:  1/22/2019  Surgery Location: [facility]    Surgeon:  Dr. Manning    Assessment/Plan:     1. Preop general physical exam  His exam and history today reveal no contraindication to proceeding as planned with the procedure.  He will take his physostigmine the morning of surgery in a small amount of water, and can hold all other medications until post operatively. He has no history of heart or lung disease.  I recommend proceeding as planned with the procedure.   - Electrocardiogram Perform and Read  - HM1(CBC and Differential)  - Basic Metabolic Panel  - HM1 (CBC with Diff)    2. Myasthenia gravis (H)  This is stable.  Symptoms have been confined to his eyes.  Tolerating his medications well.      Surgical Procedure Risk: Low (reported cardiac risk generally < 1%)  Have you had prior anesthesia: YES  Have you or any family members had a previous anesthesia reaction:  No  Do you or any family members have a history of a clotting or bleeding disorder?: No  Cardiac Risk Assessment: no increased risk for major cardiac complications    Patient approved for surgery with general or local anesthesia.    Functional Status: Independent  Patient plans to recover at home with family.     Subjective:      Charles E Aase is a 83 y.o. male who presents for a preoperative consultation.  He has elected to proceed with total hip arthroplasty.  He has felt well.  Myasthenia Gravis has been controlled with his two times a day physostigmine.  There has been no new breathing or arm or leg symptoms.  He has no history of clotting or bleeding or unusual adverse reaction to anesthesia.  No recent fever, or unusual cough.     All other systems reviewed and are negative, other than those listed in the HPI.    Pertinent History  Do you have difficulty breathing or chest pain after walking up a flight of stairs: No  History of obstructive sleep apnea: No  Steroid use in  the last 6 months: No  Frequent Aspirin/NSAID use: Yes: on hold prior to surgery  Prior Blood Transfusion: No  Prior Blood Transfusion Reaction: No  If for some reason prior to, during or after the procedure, if it is medically indicated, would you be willing to have a blood transfusion?:  There is no transfusion refusal.    Current Outpatient Medications   Medication Sig Dispense Refill     levothyroxine (SYNTHROID, LEVOTHROID) 75 MCG tablet Take 1 tablet (75 mcg total) by mouth daily. 90 tablet 3     polyethylene glycol (MIRALAX) 17 gram packet Take 17 g by mouth 2 (two) times a day.       pyridostigmine (MESTINON) 60 mg tablet   3     ramipril (ALTACE) 10 MG capsule TAKE 1 CAPSULE BY MOUTH EVERY DAY 90 capsule 3     simvastatin (ZOCOR) 40 MG tablet TAKE 1 TABLET BY MOUTH DAILY 90 tablet 3     tamsulosin (FLOMAX) 0.4 mg Cp24 TAKE 1 CAPSULE BY MOUTH EVERY DAY 90 capsule 3     vit A-vit C-vit E-zinc-copper (EYE VITAMIN AND MINERALS) 7,160-113-100 unit-mg-unit Tab Take 1 tablet by mouth daily. 90 tablet 3     aspirin 81 MG EC tablet        cholecalciferol, vitamin D3, 2,000 unit capsule Take 1,000 Units by mouth daily.       No current facility-administered medications for this visit.       No Known Allergies    Patient Active Problem List   Diagnosis     Macular Degeneration     Hypothyroidism     Hypertension     BPH (benign prostatic hyperplasia)     Hyperlipidemia LDL goal <130     Myasthenia gravis (H)     Past Medical History:   Diagnosis Date     Benign prostatic hyperplasia      Hyperlipidemia LDL goal <130 10/29/2018     Hypertension      Hypothyroidism      Myasthenia gravis (H)      Past Surgical History:   Procedure Laterality Date     TRANSURETHRAL RESECTION OF PROSTATE  1991     Social History     Socioeconomic History     Marital status:      Spouse name: Not on file     Number of children: 3     Years of education: Not on file     Highest education level: Not on file   Social Needs      "Financial resource strain: Not on file     Food insecurity - worry: Not on file     Food insecurity - inability: Not on file     Transportation needs - medical: Not on file     Transportation needs - non-medical: Not on file   Occupational History     Not on file   Tobacco Use     Smoking status: Former Smoker     Smokeless tobacco: Never Used   Substance and Sexual Activity     Alcohol use: No     Comment: occ     Drug use: No     Sexual activity: Not on file   Other Topics Concern     Not on file   Social History Narrative     Not on file     Patient Care Team:  Addi Toure MD as PCP - General (Internal Medicine)    Objective:     Vitals:    01/10/19 1024   BP: 130/80   Pulse: 73   SpO2: 97%   Weight: (!) 228 lb 1.6 oz (103.5 kg)   Height: 5' 7.25\" (1.708 m)     Physical Exam:  General:  Alert and oriented in NAD  HEENT:  No congestion  Neck:  No adenopathy, or thyroid enlargement  Chest;  Clear to ausculation  Heart:  S1 S2 without murmur  Abdomen:  Soft, flat, non-tender, no mass or rebound or guarding.   Extremities:  Minor edema, distal pulses are present.    Neuro:  Gait and speech are normal, no motor weakness, thinking is clear.     EKG:  Possible old inferior MI, no acute changes, or significant ST or T wave abnormalities.      Labs:  Recent Results (from the past 24 hour(s))   Electrocardiogram Perform and Read    Collection Time: 01/10/19 10:39 AM   Result Value Ref Range    SYSTOLIC BLOOD PRESSURE  mmHg    DIASTOLIC BLOOD PRESSURE  mmHg    VENTRICULAR RATE 72 BPM    ATRIAL RATE 72 BPM    P-R INTERVAL 222 ms    QRS DURATION 90 ms    Q-T INTERVAL 386 ms    QTC CALCULATION (BEZET) 422 ms    P Axis 39 degrees    R AXIS -16 degrees    T AXIS 29 degrees    MUSE DIAGNOSIS       Sinus rhythm with 1st degree A-V block  Inferior infarct , age undetermined  Abnormal ECG  No previous ECGs available  Confirmed by SHANELL NERI MD LOC:[facility] ([id number]) on 1/10/2019 3:45:20 PM     HM1 (CBC with Diff)    Collection " Time: 01/10/19 11:05 AM   Result Value Ref Range    WBC 9.6 4.0 - 11.0 thou/uL    RBC 4.90 4.40 - 6.20 mill/uL    Hemoglobin 15.3 14.0 - 18.0 g/dL    Hematocrit 45.9 40.0 - 54.0 %    MCV 94 80 - 100 fL    MCH 31.3 27.0 - 34.0 pg    MCHC 33.4 32.0 - 36.0 g/dL    RDW 11.6 11.0 - 14.5 %    Platelets 204 140 - 440 thou/uL    MPV 7.3 7.0 - 10.0 fL    Neutrophils % 62 50 - 70 %    Lymphocytes % 21 20 - 40 %    Monocytes % 9 2 - 10 %    Eosinophils % 7 (H) 0 - 6 %    Basophils % 1 0 - 2 %    Neutrophils Absolute 6.0 2.0 - 7.7 thou/uL    Lymphocytes Absolute 2.0 0.8 - 4.4 thou/uL    Monocytes Absolute 0.9 0.0 - 0.9 thou/uL    Eosinophils Absolute 0.6 (H) 0.0 - 0.4 thou/uL    Basophils Absolute 0.1 0.0 - 0.2 thou/uL     Electronically signed by Addi Toure MD 01/10/19 10:27 AM

## 2021-06-23 NOTE — TELEPHONE ENCOUNTER
LV[facility], please ask patient if they got a flu shot this year, if they have if they can recall approximately when and where they got it?

## 2021-06-23 NOTE — TELEPHONE ENCOUNTER
Who is calling:  [facility]  - [facility]  Reason for Call: Please fax  Pre-op labs from 1/10/19 -ASAP - Pt has surgery tomorrow 1/22/19 ,    Date of last appointment with primary care:  1/10/19   Has the patient been recently seen:  Yes  Okay to leave a detailed message: Yes           [phone]

## 2021-06-26 NOTE — PROGRESS NOTES
Progress Notes by Addi García PA-C at 3/27/2018 12:40 PM     Author: Addi García PA-C Service: -- Author Type: Physician Assistant    Filed: 3/27/2018  4:55 PM Encounter Date: 3/27/2018 Status: Signed    : Addi García PA-C (Physician Assistant)       Subjective:      Patient ID: Charles E Aase is a 82 y.o. male.    Chief Complaint:    HPI  Charles E Aase is a 82 y.o. male who presents today complaining of a 3-4 week history of dry nonproductive cough.  Patient states that he is now starting to develop slight scant amount of white phlegm.  He has had dyspnea on exertion PND fever chills or night sweats.  He has not been short of breath with activity.  No abdominal pain skin rash or vomiting or diarrhea.  Has been using over-the-counter Coricidin decongestant with some relief.  She is concerned that he is now having difficulty with worsening of his symptoms and it has been a long time so he like to be treated.      No past medical history on file.    Past Surgical History:   Procedure Laterality Date   ? TRANSURETHRAL RESECTION OF PROSTATE  1991       Family History   Problem Relation Age of Onset   ? Adopted: Yes       Social History   Substance Use Topics   ? Smoking status: Never Smoker   ? Smokeless tobacco: Never Used   ? Alcohol use No      Comment: occ       Review of Systems  As above in HPI, otherwise negative.  Objective:     /64 (Patient Site: Right Arm, Patient Position: Sitting, Cuff Size: Adult Regular)  Pulse 91  Temp 97.7  F (36.5  C) (Oral)   Resp 20  Wt 217 lb (98.4 kg)  SpO2 95%  BMI 32.99 kg/m2    Physical Exam    General: Patient is resting comfortably no acute distress is afebrile  HEENT: Head is normocephalic atraumatic   eyes are PERRLA EOMI sclera anicteric   TMs are clear bilaterally  Throat is clear  No cervical lymphadenopathy present  Lungs: Clear to auscultation bilaterally  Heart: Regular rate and rhythm  Skin: warm to touch, non-diaphoretic      Assessment:      Procedures    The encounter diagnosis was Acute bronchitis with symptoms > 10 days.    Plan:     1. Acute bronchitis with symptoms > 10 days  Nursing communication         Patient Instructions     You were seen today for acute bronchitis. This is likely due to a viral illness.    Symptom management:  - Get plenty of rest  - Avoid smoking and second hand smoke  - May take tylenol or ibuprofen for fever/discomfort  - Drink plenty of non-caffeinated fluids  - Use nasal steroid spray for sinus congestion  - Albuterol inhaler may be used every 6 hours as needed for chest tightness      Reasons to be seen in the emergency room:  - Develop a fever of 100.4 or higher  - Cough changes, coughing up blood, or become short of breath  - Neck stiffness  - Chest pain  - Severe headache  - Unable to tolerate eating or drinking fluids    Otherwise, if no symptom improvement after 5 days, follow-up with your primary care provider.    As a result of our visit today, here are the action plans for you:    1. Medication(s) to stop: There are no discontinued medications.    2. Medication(s) to start or change:   Medications Ordered   Medications   ? benzonatate (TESSALON PERLES) 100 MG capsule     Sig: Take 1 capsule (100 mg total) by mouth 3 (three) times a day as needed for cough.     Dispense:  30 capsule     Refill:  0   ? doxycycline (MONODOX) 100 MG capsule     Sig: Take 1 capsule (100 mg total) by mouth 2 (two) times a day for 10 days.     Dispense:  20 capsule     Refill:  0       3. Other instructions: Yes         Acute Bronchitis  Your healthcare provider has told you that you have acute bronchitis. Bronchitis is infection or inflammation of the bronchial tubes (airways in the lungs). Normally, air moves easily in and out of the airways. Bronchitis narrows the airways, making it harder for air to flow in and out of the lungs. This causes symptoms such as shortness of breath, coughing, and wheezing. Bronchitis can be acute or  chronic. Acute means the condition comes on quickly and goes away in a short time. Chronic means a condition lasts a long time and often comes back. Read on to learn more about acute bronchitis.    What causes acute bronchitis?  Acute bronchitis almost always starts as a viral respiratory infection, such as a cold or the flu. Certain factors make it more likely for a cold or flu to turn into bronchitis. These include being very young or very old or having a heart or lung problem. Cigarette smoking also makes bronchitis more likely.  When bronchitis develops, the airways become swollen. The airways may also become infected with bacteria. This is known as a secondary infection.  Diagnosing acute bronchitis  Your healthcare provider will examine you and ask about your symptoms and health history. You may also have a sputum culture to test the fluid in your lungs. Chest X-rays may be done to look for infection in the lungs.  Treating acute bronchitis  Bronchitis usually clears up as the cold or flu goes away. You can help feel better faster by doing the following:    Take medicine as directed. You may be told to take ibuprofen or other over-the-counter medicines. These help relieve inflammation in your bronchial tubes. Your doctor may prescribe an inhaler to help open up the bronchial tubes. Most of the time, acute bronchitis is caused by a viral infection. Antibiotics are usually not prescribed for viral infections.    Drink plenty of fluids, such as water, juice, or warm soup. Fluids loosen mucus so that you can cough it up. This helps you breathe more easily. Fluids also prevent dehydration.    Make sure you get plenty of rest.    Do not smoke. Do not allow anyone else to smoke in your home.  Recovery and follow-up  Follow up with your doctor as you are told. You will likely feel better in a week or two. But a dry cough can linger beyond that time. Let your doctor know if you still have symptoms (other than a dry  cough) after 2 weeks. If youre prone to getting bronchial infections, let your doctor know. And take steps to protect yourself from future infections. These steps include stopping smoking and avoiding tobacco smoke, washing your hands often, and getting a yearly flu shot.  When to call the doctor  Call the doctor if you have any of the following:    Fever of 100.4 F (38.0 C) higher    Symptoms that get worse, or new symptoms    Trouble breathing    Symptoms that dont start to improve within a week, or within 3 days of taking antibiotics   Date Last Reviewed: 8/4/2014 2000-2016 [employer]. [address] [place] [address], [place] [address]. All rights reserved. This information is not intended as a substitute for professional medical care. Always follow your healthcare professional's instructions.

## 2024-12-05 NOTE — TELEPHONE ENCOUNTER
Wife calling back, Pt received Flu shot 10/10/18 @ [facility].   Please cathy Pt and advise when he needs to see MD again.        68